# Patient Record
Sex: MALE | Race: WHITE | NOT HISPANIC OR LATINO | Employment: FULL TIME | URBAN - METROPOLITAN AREA
[De-identification: names, ages, dates, MRNs, and addresses within clinical notes are randomized per-mention and may not be internally consistent; named-entity substitution may affect disease eponyms.]

---

## 2022-09-01 ENCOUNTER — OFFICE VISIT (OUTPATIENT)
Dept: GASTROENTEROLOGY | Facility: CLINIC | Age: 47
End: 2022-09-01
Payer: COMMERCIAL

## 2022-09-01 VITALS
WEIGHT: 223.4 LBS | HEIGHT: 69 IN | DIASTOLIC BLOOD PRESSURE: 90 MMHG | BODY MASS INDEX: 33.09 KG/M2 | SYSTOLIC BLOOD PRESSURE: 152 MMHG

## 2022-09-01 DIAGNOSIS — R10.30 LOWER ABDOMINAL PAIN: Primary | ICD-10-CM

## 2022-09-01 DIAGNOSIS — K21.9 GASTROESOPHAGEAL REFLUX DISEASE, UNSPECIFIED WHETHER ESOPHAGITIS PRESENT: ICD-10-CM

## 2022-09-01 DIAGNOSIS — Z12.11 COLON CANCER SCREENING: ICD-10-CM

## 2022-09-01 PROCEDURE — 99204 OFFICE O/P NEW MOD 45 MIN: CPT | Performed by: INTERNAL MEDICINE

## 2022-09-01 RX ORDER — FAMOTIDINE 20 MG/1
20 TABLET, FILM COATED ORAL 2 TIMES DAILY
COMMUNITY
End: 2022-09-01 | Stop reason: SDUPTHER

## 2022-09-01 RX ORDER — DICYCLOMINE HYDROCHLORIDE 10 MG/1
CAPSULE ORAL
COMMUNITY
Start: 2022-08-25 | End: 2022-09-01

## 2022-09-01 RX ORDER — FAMOTIDINE 20 MG/1
20 TABLET, FILM COATED ORAL 2 TIMES DAILY PRN
Start: 2022-09-01

## 2022-09-01 NOTE — PROGRESS NOTES
[id number] [facility] - Outpatient Consultation  Johnny Coe 55 y o  male MRN: [id number]  Encounter: [id number]    ASSESSMENT AND PLAN:      1  Lower abdominal pain  This is a 27-year-old male self referred to us for lower abdominal pain for 3 weeks  Appears no improvement with dicyclomine  No obvious triggers to consider radiating back pains  Currently feeling okay although not eating his usual foods  Abdominal exam is completely benign today  CT reviewed from last week, completely normal except for some cholelithiasis  Normal CBC and CMP last month  - concerning for radiating back pain, but given some association with eating, will check an ultrasound to assure no biliary sludge or chronic cholecystitis  - given no improvement with dicyclomine, I advised that he stops this medication and monitor clinically  - will obtain records from his previous colonoscopy last year  - US abdomen limited; Future    2  Gastroesophageal reflux disease, unspecified whether esophagitis present  Chronic  On Pepcid p r n  Felipa Booker - famotidine (PEPCID) 20 mg tablet; Take 1 tablet (20 mg total) by mouth 2 (two) times a day as needed for heartburn    3  Colon cancer screening  Average risk, states he had a negative colonoscopy last year  Will obtain those records from [facility]  Followup Appointment:  Pending above  ______________________________________________________________________    Chief Complaint   Patient presents with    Abdominal Pain    Cholelithiasis       HPI:   Johnny Coe is a 55y o  year old male who presents today as a new patient for abdominal pain  Patient states that for the past 3 weeks, he has had lower abdominal pain that has been constant  The pain is mainly in the right lower quadrant radiating bilaterally to lower abdomen and wrapping around to bilateral lower back  No nausea or vomiting  No bleeding  Bowels are normal   No fevers or chills    No recent strenuous exercises or lifting  No chronic back pains  No bowel changes  No recent illnesses or changes in diet  Patient saw his PCP who ordered a stat CT for presumed diverticulitis  He took 2 days of Augmentin without improvement  CT showed only a nonobstructing cholelithiasis without any evidence of diverticulitis so the Augmentin was stopped  He has been eating lighter, avoiding fatty foods  Pain sometimes wakes him up at night but nothing to the point of night sweats  Takes Tylenol without improvement  Gas-X did not help  His GI doctor at [facility] gave him some dicyclomine which did not help  He is here today for 2nd opinion  Historical Information   Past Medical History:   Diagnosis Date    GERD (gastroesophageal reflux disease)      Past Surgical History:   Procedure Laterality Date    COLONOSCOPY  10/2021    [address] END[address]       Social History     Substance and Sexual Activity   Alcohol Use Yes    Alcohol/week: 6 0 standard drinks    Types: 6 Cans of beer per week     Social History     Substance and Sexual Activity   Drug Use Never     Social History     Tobacco Use   Smoking Status Former Smoker   Smokeless Tobacco Never Used     Family History   Problem Relation Age of Onset    Gallbladder disease Mother     Heart disease Father     No Known Problems Sister     Colon cancer Neg Hx     Colon polyps Neg Hx     Inflammatory bowel disease Neg Hx        Meds/Allergies     Current Outpatient Medications:     famotidine (PEPCID) 20 mg tablet    No Known Allergies    PHYSICAL EXAM:    Blood pressure 152/90, height 5' 9" (1 753 m), weight 101 kg (223 lb 6 4 oz)  Body mass index is 32 99 kg/m²  General Appearance: NAD, cooperative, alert  Eyes: Anicteric, PERRLA, EOMI  ENT:  Normocephalic, atraumatic, normal mucosa      Neck:  Supple, symmetrical, trachea midline,   Resp:  Clear to auscultation bilaterally; no rales, rhonchi or wheezing; respirations unlabored   CV:  S1 S2, Regular rate and rhythm; no murmur, rub, or gallop  GI:  Soft, non-tender, non-distended; normal bowel sounds; no masses, no organomegaly   Rectal: Deferred  Musculoskeletal: No cyanosis, clubbing or edema  Normal ROM  Skin:  No jaundice, rashes, or lesions   Heme/Lymph: No palpable cervical lymphadenopathy  Psych: Normal affect, good eye contact  Neuro: No gross deficits, AAOx3    Lab Results:   Normal CBC and CMP from 07/08/2022    Radiology Results:   CT abdomen pelvis from 08/23 reviewed    REVIEW OF SYSTEMS:    CONSTITUTIONAL: Denies any fever, chills, rigors, and weight loss  HEENT: No earache or tinnitus  Denies hearing loss or visual disturbances  CARDIOVASCULAR: No chest pain or palpitations  RESPIRATORY: Denies any cough, hemoptysis, shortness of breath or dyspnea on exertion  GASTROINTESTINAL: As noted in the History of Present Illness  GENITOURINARY: No problems with urination  Denies any hematuria or dysuria  NEUROLOGIC: No dizziness or vertigo, denies headaches  MUSCULOSKELETAL: Denies any muscle or joint pain  SKIN: Denies skin rashes or itching  ENDOCRINE: Denies excessive thirst  Denies intolerance to heat or cold  PSYCHOSOCIAL: Denies depression or anxiety  Denies any recent memory loss

## 2022-09-09 ENCOUNTER — TELEPHONE (OUTPATIENT)
Dept: GASTROENTEROLOGY | Facility: CLINIC | Age: 47
End: 2022-09-09

## 2022-09-09 DIAGNOSIS — K21.9 GASTROESOPHAGEAL REFLUX DISEASE, UNSPECIFIED WHETHER ESOPHAGITIS PRESENT: ICD-10-CM

## 2022-09-09 DIAGNOSIS — R10.30 LOWER ABDOMINAL PAIN: Primary | ICD-10-CM

## 2022-09-09 NOTE — TELEPHONE ENCOUNTER
----- Message from Zoe Ramos MD sent at 9/9/2022 11:41 AM EDT -----  Regarding: FW: [employer]  Can we order a CMP, CBC and get the ultrasound more urgently? It's Friday, so probably not stat, but earlier if possible  Thanks      ----- Message -----  From: Sumit Juarez  Sent: 9/6/2022   8:01 AM EDT  To: Zoe Ramos MD  Subject: Hamp Kalyan: Leah Marte                                     ----- Message -----  From: Carissa Christopher  Sent: 9/3/2022   3:35 PM EDT  To: , #  Subject: Annalise Deyvi Dr [facility],    I was wondering if you can make the Ultrasound a stat ultrasound, as appointments for any ultrasounds are at least 3 weeks out, ( I checked multiple locations and all are the 3rd week of September ) and I don't want to sit in pain for another 3 weeks, if theres some type of blockage  I was also wondering if I should have a more recent bloodwork test done, to make sure nothing has cropped up since my last bloodwork results  Additionally, I wanted to ask your thoughts on alcoholic beverages  I typically have a few beers on the weekends, but I'm hesitant not knowing if  the byproducts of the beer ( ie yeast, etc ) would be broken down efficiently if my gallbladder is experiencing issues      Thank you,    Roque Brewster

## 2022-09-09 NOTE — TELEPHONE ENCOUNTER
I contacted patient and informed him that I scheduled his ultrasound for tomorrow 9/10/22 at [address] [facility], [place] for 11 am  Patient still requests lab orders be placed   I confirmed [facility]

## 2022-09-10 ENCOUNTER — HOSPITAL ENCOUNTER (OUTPATIENT)
Dept: ULTRASOUND IMAGING | Facility: HOSPITAL | Age: 47
Discharge: HOME | End: 2022-09-10
Attending: INTERNAL MEDICINE
Payer: COMMERCIAL

## 2022-09-10 DIAGNOSIS — R10.30 LOWER ABDOMINAL PAIN: ICD-10-CM

## 2022-09-10 PROCEDURE — 76705 ECHO EXAM OF ABDOMEN: CPT

## 2022-09-15 NOTE — RESULT ENCOUNTER NOTE
Dear Gracy Wray,    The ultrasound is consistent with the findings from the CT  No concerning causes for pain  Some fatty liver and gallstones, but no acute cholecystitis or sludge was noted  I recommend obtaining the blood work ordered and if all is good, follow up with your primary doctor to see if the back pains are the source of your discomfort  Regarding the fatty liver, I recommend gentle weight loss and healthy eating and exercise, while limiting alcohol intake  If you have any further questions of concerns, please follow up with us in the office at your convenience  Thank you      Dr Yin Hartley